# Patient Record
Sex: MALE | Race: ASIAN
[De-identification: names, ages, dates, MRNs, and addresses within clinical notes are randomized per-mention and may not be internally consistent; named-entity substitution may affect disease eponyms.]

---

## 2022-09-18 ENCOUNTER — HOSPITAL ENCOUNTER (EMERGENCY)
Dept: HOSPITAL 76 - ED | Age: 27
LOS: 1 days | Discharge: HOME | End: 2022-09-19
Payer: COMMERCIAL

## 2022-09-18 VITALS — SYSTOLIC BLOOD PRESSURE: 125 MMHG | DIASTOLIC BLOOD PRESSURE: 80 MMHG

## 2022-09-18 DIAGNOSIS — J06.9: Primary | ICD-10-CM

## 2022-09-18 PROCEDURE — 94664 DEMO&/EVAL PT USE INHALER: CPT

## 2022-09-18 PROCEDURE — 99281 EMR DPT VST MAYX REQ PHY/QHP: CPT

## 2022-09-18 PROCEDURE — 99282 EMERGENCY DEPT VISIT SF MDM: CPT

## 2022-09-18 PROCEDURE — 94640 AIRWAY INHALATION TREATMENT: CPT

## 2022-09-19 NOTE — ED PHYSICIAN DOCUMENTATION
PD HPI URI





- Stated complaint


Stated Complaint: CONGESTED/FEVER





- Chief complaint


Chief Complaint: General





- History obtained from


History obtained from: Patient





- History of Present Illness


Timing - onset: Today, Last night


Timing duration: Days (1)


Timing details: Abrupt onset, Still present


Associated symptoms: Fever, Chills, Nasal congestion, Sore throat, Dry cough.  

No: Dyspnea, NVD


Contributing factors: Sick contact (wife and son with same symptoms for 3 

days.).  No: COPD / asthma


Similar symptoms before: Has not had sx before


Recently seen: Not recently seen





Review of Systems


Constitutional: reports: Fever, Chills, Myalgias


Nose: reports: Congestion


Throat: reports: Sore throat


Respiratory: reports: Cough


GI: reports: Diarrhea.  denies: Vomiting


Skin: denies: Rash


Neurologic: denies: Altered mental status, Headache





PD PAST MEDICAL HISTORY





- Past Medical History


Cardiovascular: None


Respiratory: None


Endocrine/Autoimmune: None





- Present Medications


Home Medications: 


                                Ambulatory Orders











 Medication  Instructions  Recorded  Confirmed


 


Albuterol Sulf [Ventolin Hfa 2 - 3 puffs INH Q4HR PRN #1 gm 09/19/22 





Inhaler]   


 


Benzonatate [Tessalon] 100 mg PO TID PRN #20 cap 09/19/22 


 


Ibuprofen [Motrin] 600 mg PO TID PRN #20 tab 09/19/22 














- Allergies


Allergies/Adverse Reactions: 


                                    Allergies











Allergy/AdvReac Type Severity Reaction Status Date / Time


 


No Known Drug Allergies Allergy   Verified 09/18/22 23:37














PD ED PE NORMAL





- Vitals


Vital signs reviewed: Yes





- General


General: Alert and oriented X 3, No acute distress, Well developed/nourished





- HEENT


HEENT: Moist mucous membranes, Pharynx benign





- Neck


Neck: Supple, no meningeal sign, No adenopathy





- Cardiac


Cardiac: RRR, No murmur





- Respiratory


Respiratory: Clear bilaterally





- Abdomen


Abdomen: Soft, Non tender





- Derm


Derm: Normal color, Warm and dry





- Extremities


Extremities: Normal ROM s pain





- Neuro


Neuro: Alert and oriented X 3, No motor deficit, Normal speech





Results





- Vitals


Vitals: 


                               Vital Signs - 24 hr











  09/18/22





  23:34


 


Temperature 36.5 C


 


Heart Rate 81


 


Respiratory 18





Rate 


 


Blood Pressure 125/80


 


O2 Saturation 97








                                     Oxygen











O2 Source                      Room air

















PD MEDICAL DECISION MAKING





- ED course


Complexity details: reviewed results (wife and son PCR tested positive for 

rhinovirus.), considered differential, d/w patient





Departure





- Departure


Disposition: 01 Home, Self Care


Condition: Stable


Prescriptions: 


Albuterol Sulf [Ventolin Hfa Inhaler] 2 - 3 puffs INH Q4HR PRN #1 gm


 PRN Reason: Shortness Of Air/Wheezing


Ibuprofen [Motrin] 600 mg PO TID PRN #20 tab


 PRN Reason: Pain


Benzonatate [Tessalon] 100 mg PO TID PRN #20 cap


 PRN Reason: Cough


Discharge Date/Time: 09/19/22 01:50